# Patient Record
Sex: FEMALE | Race: WHITE | NOT HISPANIC OR LATINO | Employment: UNEMPLOYED | URBAN - METROPOLITAN AREA
[De-identification: names, ages, dates, MRNs, and addresses within clinical notes are randomized per-mention and may not be internally consistent; named-entity substitution may affect disease eponyms.]

---

## 2017-06-08 ENCOUNTER — HISTORICAL (OUTPATIENT)
Dept: RADIOLOGY | Facility: HOSPITAL | Age: 42
End: 2017-06-08

## 2017-08-25 ENCOUNTER — HISTORICAL (OUTPATIENT)
Dept: SURGERY | Facility: HOSPITAL | Age: 42
End: 2017-08-25

## 2017-08-25 LAB
ABS NEUT (OLG): 3.9 X10(3)/MCL (ref 1.5–6.9)
B-HCG SERPL QL: NEGATIVE
BUN SERPL-MCNC: 20 MG/DL (ref 10–20)
CALCIUM SERPL-MCNC: 9 MG/DL (ref 8–10.5)
CHLORIDE SERPL-SCNC: 108 MMOL/L (ref 100–108)
CO2 SERPL-SCNC: 26 MMOL/L (ref 21–35)
CREAT SERPL-MCNC: 0.68 MG/DL (ref 0.7–1.3)
ERYTHROCYTE [DISTWIDTH] IN BLOOD BY AUTOMATED COUNT: 15.6 % (ref 11.5–17)
GLUCOSE SERPL-MCNC: 78 MG/DL (ref 75–116)
GROUP & RH: NORMAL
HCT VFR BLD AUTO: 38.3 % (ref 36–48)
HGB BLD-MCNC: 12.2 GM/DL (ref 12–16)
MCH RBC QN AUTO: 28 PG (ref 27–34)
MCHC RBC AUTO-ENTMCNC: 32 GM/DL (ref 31–36)
MCV RBC AUTO: 88 FL (ref 80–99)
PLATELET # BLD AUTO: 302 X10(3)/MCL (ref 140–400)
PMV BLD AUTO: 9.8 FL (ref 6.8–10)
POTASSIUM SERPL-SCNC: 3.9 MMOL/L (ref 3.6–5.2)
RBC # BLD AUTO: 4.33 X10(6)/MCL (ref 4.2–5.4)
SODIUM SERPL-SCNC: 142 MMOL/L (ref 135–145)
WBC # SPEC AUTO: 6.7 X10(3)/MCL (ref 4.5–11.5)

## 2017-08-28 ENCOUNTER — HISTORICAL (OUTPATIENT)
Dept: ANESTHESIOLOGY | Facility: HOSPITAL | Age: 42
End: 2017-08-28

## 2017-11-22 LAB — RAPID GROUP A STREP (OHS): NEGATIVE

## 2018-04-03 ENCOUNTER — HISTORICAL (OUTPATIENT)
Dept: ADMINISTRATIVE | Facility: HOSPITAL | Age: 43
End: 2018-04-03

## 2018-04-03 LAB
HIV 1+2 AB+HIV1 P24 AG SERPL QL IA: NEGATIVE
RPR SER QL: NORMAL

## 2018-07-09 ENCOUNTER — HISTORICAL (OUTPATIENT)
Dept: ADMINISTRATIVE | Facility: HOSPITAL | Age: 43
End: 2018-07-09

## 2018-07-09 LAB
ABS NEUT (OLG): 3.07 X10(3)/MCL (ref 2.1–9.2)
ALBUMIN SERPL-MCNC: 3.7 GM/DL (ref 3.4–5)
ALBUMIN/GLOB SERPL: 1.1 {RATIO}
ALP SERPL-CCNC: 71 UNIT/L (ref 38–126)
ALT SERPL-CCNC: 25 UNIT/L (ref 12–78)
AST SERPL-CCNC: 15 UNIT/L (ref 15–37)
BASOPHILS # BLD AUTO: 0 X10(3)/MCL (ref 0–0.2)
BASOPHILS NFR BLD AUTO: 1 %
BILIRUB SERPL-MCNC: 0.3 MG/DL (ref 0.2–1)
BILIRUBIN DIRECT+TOT PNL SERPL-MCNC: 0.1 MG/DL (ref 0–0.2)
BILIRUBIN DIRECT+TOT PNL SERPL-MCNC: 0.2 MG/DL (ref 0–0.8)
BUN SERPL-MCNC: 14 MG/DL (ref 7–18)
CALCIUM SERPL-MCNC: 8.5 MG/DL (ref 8.5–10.1)
CHLORIDE SERPL-SCNC: 108 MMOL/L (ref 98–107)
CO2 SERPL-SCNC: 28 MMOL/L (ref 21–32)
CREAT SERPL-MCNC: 0.64 MG/DL (ref 0.55–1.02)
EOSINOPHIL # BLD AUTO: 0.1 X10(3)/MCL (ref 0–0.9)
EOSINOPHIL NFR BLD AUTO: 2 %
ERYTHROCYTE [DISTWIDTH] IN BLOOD BY AUTOMATED COUNT: 13.6 % (ref 11.5–17)
GLOBULIN SER-MCNC: 3.4 GM/DL (ref 2.4–3.5)
GLUCOSE SERPL-MCNC: 81 MG/DL (ref 74–106)
HCT VFR BLD AUTO: 40.4 % (ref 37–47)
HGB BLD-MCNC: 13 GM/DL (ref 12–16)
LYMPHOCYTES # BLD AUTO: 1.6 X10(3)/MCL (ref 0.6–4.6)
LYMPHOCYTES NFR BLD AUTO: 30 %
MCH RBC QN AUTO: 31 PG (ref 27–31)
MCHC RBC AUTO-ENTMCNC: 32.2 GM/DL (ref 33–36)
MCV RBC AUTO: 96.4 FL (ref 80–94)
MONOCYTES # BLD AUTO: 0.4 X10(3)/MCL (ref 0.1–1.3)
MONOCYTES NFR BLD AUTO: 8 %
NEUTROPHILS # BLD AUTO: 3.07 X10(3)/MCL (ref 1.4–7.9)
NEUTROPHILS NFR BLD AUTO: 59 %
PLATELET # BLD AUTO: 267 X10(3)/MCL (ref 130–400)
PMV BLD AUTO: 8.8 FL (ref 9.4–12.4)
POTASSIUM SERPL-SCNC: 4.4 MMOL/L (ref 3.5–5.1)
PROT SERPL-MCNC: 7.1 GM/DL (ref 6.4–8.2)
RBC # BLD AUTO: 4.19 X10(6)/MCL (ref 4.2–5.4)
SODIUM SERPL-SCNC: 141 MMOL/L (ref 136–145)
TSH SERPL-ACNC: 0.86 MIU/L (ref 0.36–3.74)
WBC # SPEC AUTO: 5.2 X10(3)/MCL (ref 4.5–11.5)

## 2019-08-14 ENCOUNTER — HISTORICAL (OUTPATIENT)
Dept: ADMINISTRATIVE | Facility: HOSPITAL | Age: 44
End: 2019-08-14

## 2019-08-14 LAB
BUN SERPL-MCNC: 13 MG/DL (ref 7–18)
CALCIUM SERPL-MCNC: 9.8 MG/DL (ref 8.5–10.1)
CHLORIDE SERPL-SCNC: 108 MMOL/L (ref 98–107)
CO2 SERPL-SCNC: 29 MMOL/L (ref 21–32)
CREAT SERPL-MCNC: 0.64 MG/DL (ref 0.55–1.02)
CREAT/UREA NIT SERPL: 20.3
GLUCOSE SERPL-MCNC: 80 MG/DL (ref 74–106)
POTASSIUM SERPL-SCNC: 3.9 MMOL/L (ref 3.5–5.1)
SODIUM SERPL-SCNC: 141 MMOL/L (ref 136–145)

## 2020-06-09 ENCOUNTER — HISTORICAL (OUTPATIENT)
Dept: ADMINISTRATIVE | Facility: HOSPITAL | Age: 45
End: 2020-06-09

## 2020-06-09 LAB
BUN SERPL-MCNC: 14.9 MG/DL (ref 7–18.7)
CALCIUM SERPL-MCNC: 8.9 MG/DL (ref 8.4–10.2)
CHLORIDE SERPL-SCNC: 107 MMOL/L (ref 98–107)
CO2 SERPL-SCNC: 23 MMOL/L (ref 22–29)
CREAT SERPL-MCNC: 0.75 MG/DL (ref 0.55–1.02)
CREAT/UREA NIT SERPL: 20
GLUCOSE SERPL-MCNC: 79 MG/DL (ref 74–100)
POTASSIUM SERPL-SCNC: 4.3 MMOL/L (ref 3.5–5.1)
SODIUM SERPL-SCNC: 139 MMOL/L (ref 136–145)

## 2022-03-09 ENCOUNTER — HISTORICAL (OUTPATIENT)
Dept: ADMINISTRATIVE | Facility: HOSPITAL | Age: 47
End: 2022-03-09

## 2022-03-09 LAB
ABS NEUT (OLG): 3.76 (ref 2.1–9.2)
ALBUMIN SERPL-MCNC: 4.3 G/DL (ref 3.5–5)
ALBUMIN/GLOB SERPL: 1.4 {RATIO} (ref 1.1–2)
ALP SERPL-CCNC: 47 U/L (ref 40–150)
ALT SERPL-CCNC: 29 U/L (ref 0–55)
AST SERPL-CCNC: 22 U/L (ref 5–34)
BASOPHILS # BLD AUTO: 0 10*3/UL (ref 0–0.2)
BASOPHILS NFR BLD AUTO: 0 %
BILIRUB SERPL-MCNC: 0.3 MG/DL
BILIRUBIN DIRECT+TOT PNL SERPL-MCNC: 0.1 (ref 0–0.5)
BILIRUBIN DIRECT+TOT PNL SERPL-MCNC: 0.2 (ref 0–0.8)
BUN SERPL-MCNC: 7.8 MG/DL (ref 7–18.7)
CALCIUM SERPL-MCNC: 9.7 MG/DL (ref 8.7–10.5)
CHLORIDE SERPL-SCNC: 104 MMOL/L (ref 98–107)
CHOLEST SERPL-MCNC: 167 MG/DL
CHOLEST/HDLC SERPL: 2 {RATIO} (ref 0–5)
CO2 SERPL-SCNC: 27 MMOL/L (ref 22–29)
CREAT SERPL-MCNC: 0.71 MG/DL (ref 0.55–1.02)
DEPRECATED CALCIDIOL+CALCIFEROL SERPL-MC: 34.5 NG/ML (ref 30–80)
EOSINOPHIL # BLD AUTO: 0.1 10*3/UL (ref 0–0.9)
EOSINOPHIL NFR BLD AUTO: 2 %
ERYTHROCYTE [DISTWIDTH] IN BLOOD BY AUTOMATED COUNT: 13.1 % (ref 11.5–17)
EST. AVERAGE GLUCOSE BLD GHB EST-MCNC: 96.8 MG/DL
GLOBULIN SER-MCNC: 3.1 G/DL (ref 2.4–3.5)
GLUCOSE SERPL-MCNC: 74 MG/DL (ref 74–100)
HBA1C MFR BLD: 5 %
HCT VFR BLD AUTO: 40.5 % (ref 37–47)
HDLC SERPL-MCNC: 82 MG/DL (ref 35–60)
HEMOLYSIS INTERF INDEX SERPL-ACNC: 1
HGB BLD-MCNC: 13.1 G/DL (ref 12–16)
ICTERIC INTERF INDEX SERPL-ACNC: 0
LDLC SERPL CALC-MCNC: 69 MG/DL (ref 50–140)
LIPEMIC INTERF INDEX SERPL-ACNC: 9
LYMPHOCYTES # BLD AUTO: 2 10*3/UL (ref 0.6–4.6)
LYMPHOCYTES NFR BLD AUTO: 31 %
MANUAL DIFF? (OHS): NO
MCH RBC QN AUTO: 31.7 PG (ref 27–31)
MCHC RBC AUTO-ENTMCNC: 32.3 G/DL (ref 33–36)
MCV RBC AUTO: 98.1 FL (ref 80–94)
MONOCYTES # BLD AUTO: 0.5 10*3/UL (ref 0.1–1.3)
MONOCYTES NFR BLD AUTO: 7 %
NEUTROPHILS # BLD AUTO: 3.76 10*3/UL (ref 2.1–9.2)
NEUTROPHILS NFR BLD AUTO: 59 %
PLATELET # BLD AUTO: 316 10*3/UL (ref 130–400)
PMV BLD AUTO: 9.7 FL (ref 9.4–12.4)
POTASSIUM SERPL-SCNC: 4.3 MMOL/L (ref 3.5–5.1)
PROT SERPL-MCNC: 7.4 G/DL (ref 6.4–8.3)
RBC # BLD AUTO: 4.13 10*6/UL (ref 4.2–5.4)
SODIUM SERPL-SCNC: 141 MMOL/L (ref 136–145)
TRIGL SERPL-MCNC: 81 MG/DL (ref 37–140)
TSH SERPL-ACNC: 0.95 M[IU]/L (ref 0.35–4.94)
VLDLC SERPL CALC-MCNC: 16 MG/DL
WBC # SPEC AUTO: 6.3 10*3/UL (ref 4.5–11.5)

## 2022-04-10 ENCOUNTER — HISTORICAL (OUTPATIENT)
Dept: ADMINISTRATIVE | Facility: HOSPITAL | Age: 47
End: 2022-04-10
Payer: COMMERCIAL

## 2022-04-29 VITALS
HEIGHT: 63 IN | OXYGEN SATURATION: 99 % | SYSTOLIC BLOOD PRESSURE: 102 MMHG | BODY MASS INDEX: 29.3 KG/M2 | DIASTOLIC BLOOD PRESSURE: 76 MMHG | WEIGHT: 165.38 LBS

## 2022-04-30 NOTE — OP NOTE
Preoperative diagnosis is breast ptosis  Postoperative diagnosis is same  Operation performed is a bilateral mastopexy with breast augmentation  Surgeon Dr. Shawn Montgomery  Assistant Dr. Sherlyn Fitzpatrick  Anesthesia is general endotracheal  Complications none  Procedure note   The patient was brought to the operating room and placed under general endotracheal anesthesia she was placed in the dorsal supine position with her arms extended outwards.  Prior to the surgery the patient had markings placed in the sitting position.  These markings were then outlined with the scalpel and the superior medial pedicle was also outlined.  The entire area was de-epithelialized with the exception of the new nipple which was made 4 cm in diameter.  Superior medial pedicle was then mobilized and excellent hemostasis was made with unipolar cautery.  Excess tissue in the area was then removed with unipolar cautery.  Down to the pectoralis muscle fascia.  A 1-2 cm from the lateral border of the pectoralis muscle was identified and incised with unipolar cautery and a submuscular pocket was dissected bluntly.  Unipolar cautery was used to obtain excellent C excellent hemostasis as well as to free up the lower border of the pocket.  The pocket was then irrigated with dilute Betadine solution.  The same was on the opposite side.  Superior trifurcation was then reapproximated with 2-0 Vicryl.  An 12 o'clock position of the nipple areola complex was then attached superiorly with 3-0 suture.  The implant was in place and submuscular pocket and filled up to the predetermined volume.  Some on the opposite side inferior trifurcation was also reapproximated with 2-0 Vicryl the nipples and were attached and 36 and 9:00 positions with 3-0 Monocryl and the platelet pillars in the vertical arm of the mastopexy incision was also reapproximated with multiple 3-0 Monocryl interrupted stitches.  The new inframammary crease was also reapproximated in a  similar fashion.  All the dictation tension was taken off of the skin edges 4-0 Monocryl was then used to close the remainder of the incision.  Sono on the opposite side with what appeared to be good symmetry.  The nipple was also noted to have good capillary refill.  At this time the procedure was completed.  Patient was placed in her appropriate postoperative garments and dressings and was sent to recovery in stable condition

## 2022-09-21 ENCOUNTER — HISTORICAL (OUTPATIENT)
Dept: ADMINISTRATIVE | Facility: HOSPITAL | Age: 47
End: 2022-09-21
Payer: COMMERCIAL

## 2022-10-21 ENCOUNTER — TELEPHONE (OUTPATIENT)
Dept: NEUROLOGY | Facility: CLINIC | Age: 47
End: 2022-10-21
Payer: COMMERCIAL

## 2022-10-21 RX ORDER — OXCARBAZEPINE 150 MG/1
TABLET, FILM COATED ORAL
Qty: 300 TABLET | Refills: 3 | Status: SHIPPED | OUTPATIENT
Start: 2022-10-21 | End: 2023-04-06 | Stop reason: SDUPTHER

## 2022-10-21 RX ORDER — OXCARBAZEPINE 600 MG/1
600 TABLET, FILM COATED ORAL NIGHTLY
Qty: 90 TABLET | Refills: 3 | Status: SHIPPED | OUTPATIENT
Start: 2022-10-21 | End: 2023-04-06 | Stop reason: SDUPTHER

## 2022-10-21 NOTE — TELEPHONE ENCOUNTER
----- Message from Chelsea Rosina sent at 10/20/2022  4:34 PM CDT -----  Patient is schedule for appt Tuesday afternoon, however we need to reschedule but sh cant come earlier due to her taking off, She states she is doing fine no seizures but just needing a refill on meds. I told her I would ask ,if not we have to reschedule appt

## 2023-04-06 ENCOUNTER — OFFICE VISIT (OUTPATIENT)
Dept: NEUROLOGY | Facility: CLINIC | Age: 48
End: 2023-04-06
Payer: COMMERCIAL

## 2023-04-06 VITALS — DIASTOLIC BLOOD PRESSURE: 87 MMHG | SYSTOLIC BLOOD PRESSURE: 116 MMHG

## 2023-04-06 DIAGNOSIS — G40.909 NONINTRACTABLE EPILEPSY WITHOUT STATUS EPILEPTICUS, UNSPECIFIED EPILEPSY TYPE: Primary | ICD-10-CM

## 2023-04-06 PROCEDURE — 3074F SYST BP LT 130 MM HG: CPT | Mod: CPTII,S$GLB,, | Performed by: NURSE PRACTITIONER

## 2023-04-06 PROCEDURE — 1159F MED LIST DOCD IN RCRD: CPT | Mod: CPTII,S$GLB,, | Performed by: NURSE PRACTITIONER

## 2023-04-06 PROCEDURE — 1160F PR REVIEW ALL MEDS BY PRESCRIBER/CLIN PHARMACIST DOCUMENTED: ICD-10-PCS | Mod: CPTII,S$GLB,, | Performed by: NURSE PRACTITIONER

## 2023-04-06 PROCEDURE — 99999 PR PBB SHADOW E&M-EST. PATIENT-LVL III: ICD-10-PCS | Mod: PBBFAC,,, | Performed by: NURSE PRACTITIONER

## 2023-04-06 PROCEDURE — 3079F DIAST BP 80-89 MM HG: CPT | Mod: CPTII,S$GLB,, | Performed by: NURSE PRACTITIONER

## 2023-04-06 PROCEDURE — 3074F PR MOST RECENT SYSTOLIC BLOOD PRESSURE < 130 MM HG: ICD-10-PCS | Mod: CPTII,S$GLB,, | Performed by: NURSE PRACTITIONER

## 2023-04-06 PROCEDURE — 3079F PR MOST RECENT DIASTOLIC BLOOD PRESSURE 80-89 MM HG: ICD-10-PCS | Mod: CPTII,S$GLB,, | Performed by: NURSE PRACTITIONER

## 2023-04-06 PROCEDURE — 1159F PR MEDICATION LIST DOCUMENTED IN MEDICAL RECORD: ICD-10-PCS | Mod: CPTII,S$GLB,, | Performed by: NURSE PRACTITIONER

## 2023-04-06 PROCEDURE — 1160F RVW MEDS BY RX/DR IN RCRD: CPT | Mod: CPTII,S$GLB,, | Performed by: NURSE PRACTITIONER

## 2023-04-06 PROCEDURE — 99999 PR PBB SHADOW E&M-EST. PATIENT-LVL III: CPT | Mod: PBBFAC,,, | Performed by: NURSE PRACTITIONER

## 2023-04-06 PROCEDURE — 99214 PR OFFICE/OUTPT VISIT, EST, LEVL IV, 30-39 MIN: ICD-10-PCS | Mod: S$GLB,,, | Performed by: NURSE PRACTITIONER

## 2023-04-06 PROCEDURE — 99214 OFFICE O/P EST MOD 30 MIN: CPT | Mod: S$GLB,,, | Performed by: NURSE PRACTITIONER

## 2023-04-06 RX ORDER — OXCARBAZEPINE 150 MG/1
TABLET, FILM COATED ORAL
Qty: 300 TABLET | Refills: 3 | Status: SHIPPED | OUTPATIENT
Start: 2023-04-06

## 2023-04-06 RX ORDER — OXCARBAZEPINE 600 MG/1
600 TABLET, FILM COATED ORAL NIGHTLY
Qty: 90 TABLET | Refills: 3 | Status: SHIPPED | OUTPATIENT
Start: 2023-04-06

## 2023-04-06 RX ORDER — DIAZEPAM 10 MG/2G
5 GEL RECTAL ONCE AS NEEDED
Qty: 1 KIT | Refills: 3 | Status: SHIPPED | OUTPATIENT
Start: 2023-04-06 | End: 2023-04-06

## 2023-04-06 NOTE — PROGRESS NOTES
Subjective:      Patient ID: Cate Henry is a 47 y.o. female.    Chief Complaint:  Seizures (Pt denies seizure like activity. States medication regimen is therapeutic )      History of Present Illness  Patient presents for follow up of seizures.  Diagnosed with seizures since the age of 16. Previously was on Depakote, Keppra, and Topiramate with intolerable side effects. Currently on Trileptal with no side effects. Last zonia occurred in February. Patient reports took Diastat rectal and did not have seizure. Has not had Oxcarb level in 2 years. Patient is satisfied with current treatment plan        Past Medical History:   Diagnosis Date    Cervical radiculopathy     Chronic TMJ pain     Epilepsy     H/O bariatric surgery     Median nerve compression     Narrow angle glaucoma suspect     Seizure     TMJ syndrome        Past Surgical History:   Procedure Laterality Date    CARPAL TUNNEL RELEASE       SECTION       SECTION      INSERTION OF BREAST IMPLANT  2017    IRIDOTOMY / IRIDECTOMY      LAPAROSCOPIC SLEEVE GASTRECTOMY         Family History   Problem Relation Age of Onset    Diabetes Mother     Hypertension Mother     Cancer Father     Hypertension Father        Social History     Socioeconomic History    Marital status:    Tobacco Use    Smoking status: Never    Smokeless tobacco: Never   Substance and Sexual Activity    Alcohol use: Never    Drug use: Never       Current Outpatient Medications   Medication Sig Dispense Refill    linaCLOtide (LINZESS) 145 mcg Cap capsule Take 145 mcg by mouth.      OXcarbazepine (TRILEPTAL) 150 MG Tab TAKE 2 AND 1/2 TABLET BY MOUTH EVERY DAY  DAYS Strength: 150 mg 300 tablet 3    OXcarbazepine (TRILEPTAL) 600 MG Tab Take 1 tablet (600 mg total) by mouth every evening. 90 tablet 3    citalopram (CELEXA) 10 MG tablet Take 10 mg by mouth once daily.      hydrOXYzine HCL (ATARAX) 25 MG tablet TAKE 1 TABLET BY MOUTH EVERY EVENING FOR  14 DAYS AS NEEDED FOR INSOMNIA      melatonin (MELATIN) 3 mg tablet Take 3 mg by mouth.      ondansetron (ZOFRAN) 4 MG tablet Take 4 mg by mouth 3 (three) times daily as needed.       No current facility-administered medications for this visit.       Review of patient's allergies indicates:   Allergen Reactions    Ciprofloxacin      Other reaction(s): Seizure    Topiramate Other (See Comments)     Other reaction(s): glaucoma, H/O: glaucoma    Tramadol Other (See Comments)     Other reaction(s): Epileptic seizures    Hydrocodone-acetaminophen Nausea And Vomiting and Hives        Review of Systems  Review of Systems   Neurological:  Negative for seizures.   All other systems reviewed and are negative.  Objective:     Neurologic Exam     Mental Status   Oriented to person, place, and time.   Speech: speech is normal   Level of consciousness: alert    Cranial Nerves   Cranial nerves II through XII intact.     Motor Exam   Muscle bulk: normal    Strength   Strength 5/5 throughout.     Gait, Coordination, and Reflexes     Gait  Gait: normal    Physical Exam  Vitals and nursing note reviewed.   Cardiovascular:      Rate and Rhythm: Normal rate.   Pulmonary:      Effort: Pulmonary effort is normal.   Neurological:      Mental Status: She is oriented to person, place, and time.      Cranial Nerves: Cranial nerves 2-12 are intact.      Motor: Motor strength is normal.      Gait: Gait is intact.   Psychiatric:         Speech: Speech normal.        Assessment:     1. Nonintractable epilepsy without status epilepticus, unspecified epilepsy type      Plan:     Prescription for trileptal 150 mg 2 and a half tabs QAM and Trileptal 600 mg one tab QPM sent to patient pharmacy  Prescription for diastat sent to patient's pharmacy  Order for trileptal level given to patient

## 2023-05-30 ENCOUNTER — TELEPHONE (OUTPATIENT)
Dept: NEUROLOGY | Facility: CLINIC | Age: 48
End: 2023-05-30
Payer: COMMERCIAL

## 2023-05-30 NOTE — TELEPHONE ENCOUNTER
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  Pt reports her employer is requesting that employees return to the office rather than working remotely. 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  Pt reports attempting to return to office twice in the past but experienced auras both times and had to leave.  She reports her supervisor advised her to obtain a letter from her physician stating that it would be in her best interest to work from home.    Email for letter is [email or web address]    # [phone]

## 2023-05-30 NOTE — TELEPHONE ENCOUNTER
Letter printed out and put in front at check out for patient to  tomorrow. Verbalized understanding.

## 2023-05-30 NOTE — LETTER
May 30, 2023    Cate Henry  [address] [place] [address]         [facility] [address], [address]  [place] [address]  Phone: [phone] May 30, 2023     Patient: Cate Henry   YOB: 1975   Date of Visit: 5/30/2023       To Whom It May Concern:    It is my medical opinion that Cate Henry should work remotely due to history of seizures and sensory overload with auras . It would be in my best interest that Cate Henry works remotely.     If you have any questions or concerns, please don't hesitate to call.    Sincerely,        NIDA Mays